# Patient Record
Sex: MALE | Race: OTHER | NOT HISPANIC OR LATINO | Employment: FULL TIME | URBAN - METROPOLITAN AREA
[De-identification: names, ages, dates, MRNs, and addresses within clinical notes are randomized per-mention and may not be internally consistent; named-entity substitution may affect disease eponyms.]

---

## 2022-05-14 ENCOUNTER — HOSPITAL ENCOUNTER (EMERGENCY)
Facility: HOSPITAL | Age: 52
Discharge: HOME | End: 2022-05-14
Attending: EMERGENCY MEDICINE
Payer: OTHER MISCELLANEOUS

## 2022-05-14 VITALS
HEART RATE: 62 BPM | HEIGHT: 60 IN | RESPIRATION RATE: 16 BRPM | SYSTOLIC BLOOD PRESSURE: 130 MMHG | OXYGEN SATURATION: 100 % | BODY MASS INDEX: 24.57 KG/M2 | TEMPERATURE: 98 F | WEIGHT: 125.13 LBS | DIASTOLIC BLOOD PRESSURE: 83 MMHG

## 2022-05-14 DIAGNOSIS — S68.011A AMPUTATION OF RIGHT THUMB, INITIAL ENCOUNTER: Primary | ICD-10-CM

## 2022-05-14 DIAGNOSIS — S69.91XA INJURY OF RIGHT HAND, INITIAL ENCOUNTER: ICD-10-CM

## 2022-05-14 PROCEDURE — 12002 RPR S/N/AX/GEN/TRNK2.6-7.5CM: CPT

## 2022-05-14 PROCEDURE — 96365 THER/PROPH/DIAG IV INF INIT: CPT

## 2022-05-14 PROCEDURE — 90471 IMMUNIZATION ADMIN: CPT | Performed by: REGISTERED NURSE

## 2022-05-14 PROCEDURE — 96375 TX/PRO/DX INJ NEW DRUG ADDON: CPT

## 2022-05-14 PROCEDURE — 99284 EMERGENCY DEPT VISIT MOD MDM: CPT | Mod: 25

## 2022-05-14 PROCEDURE — 90715 TDAP VACCINE 7 YRS/> IM: CPT | Performed by: REGISTERED NURSE

## 2022-05-14 PROCEDURE — 63600175 PHARM REV CODE 636 W HCPCS: Performed by: REGISTERED NURSE

## 2022-05-14 PROCEDURE — 25000003 PHARM REV CODE 250: Performed by: REGISTERED NURSE

## 2022-05-14 RX ORDER — LIDOCAINE HYDROCHLORIDE 10 MG/ML
10 INJECTION, SOLUTION EPIDURAL; INFILTRATION; INTRACAUDAL; PERINEURAL
Status: COMPLETED | OUTPATIENT
Start: 2022-05-14 | End: 2022-05-14

## 2022-05-14 RX ORDER — CEPHALEXIN 500 MG/1
500 CAPSULE ORAL 4 TIMES DAILY
Qty: 20 CAPSULE | Refills: 0 | Status: SHIPPED | OUTPATIENT
Start: 2022-05-14 | End: 2022-05-19

## 2022-05-14 RX ORDER — ONDANSETRON 2 MG/ML
4 INJECTION INTRAMUSCULAR; INTRAVENOUS
Status: COMPLETED | OUTPATIENT
Start: 2022-05-14 | End: 2022-05-14

## 2022-05-14 RX ORDER — MORPHINE SULFATE 4 MG/ML
4 INJECTION, SOLUTION INTRAMUSCULAR; INTRAVENOUS
Status: COMPLETED | OUTPATIENT
Start: 2022-05-14 | End: 2022-05-14

## 2022-05-14 RX ORDER — HYDROCODONE BITARTRATE AND ACETAMINOPHEN 10; 325 MG/1; MG/1
1 TABLET ORAL EVERY 6 HOURS PRN
Qty: 12 TABLET | Refills: 0 | Status: SHIPPED | OUTPATIENT
Start: 2022-05-14 | End: 2022-07-01

## 2022-05-14 RX ORDER — BUPIVACAINE HYDROCHLORIDE 5 MG/ML
10 INJECTION, SOLUTION EPIDURAL; INTRACAUDAL
Status: COMPLETED | OUTPATIENT
Start: 2022-05-14 | End: 2022-05-14

## 2022-05-14 RX ORDER — CEFAZOLIN SODIUM 1 G/50ML
1 SOLUTION INTRAVENOUS
Status: COMPLETED | OUTPATIENT
Start: 2022-05-14 | End: 2022-05-14

## 2022-05-14 RX ADMIN — LIDOCAINE HYDROCHLORIDE 50 MG: 10 INJECTION, SOLUTION EPIDURAL; INFILTRATION; INTRACAUDAL at 01:05

## 2022-05-14 RX ADMIN — BUPIVACAINE HYDROCHLORIDE 50 MG: 5 INJECTION, SOLUTION EPIDURAL; INTRACAUDAL; PERINEURAL at 01:05

## 2022-05-14 RX ADMIN — CEFAZOLIN SODIUM 1 G: 1 SOLUTION INTRAVENOUS at 01:05

## 2022-05-14 RX ADMIN — TETANUS TOXOID, REDUCED DIPHTHERIA TOXOID AND ACELLULAR PERTUSSIS VACCINE, ADSORBED 0.5 ML: 5; 2.5; 8; 8; 2.5 SUSPENSION INTRAMUSCULAR at 01:05

## 2022-05-14 RX ADMIN — BACITRACIN ZINC, NEOMYCIN, POLYMYXIN B 1 EACH: 400; 3.5; 5 OINTMENT TOPICAL at 03:05

## 2022-05-14 RX ADMIN — ONDANSETRON 4 MG: 2 INJECTION INTRAMUSCULAR; INTRAVENOUS at 01:05

## 2022-05-14 RX ADMIN — MORPHINE SULFATE 4 MG: 4 INJECTION INTRAVENOUS at 01:05

## 2022-05-14 NOTE — ED PROVIDER NOTES
Encounter Date: 5/14/2022       History     Chief Complaint   Patient presents with    Laceration     Smashed R hand on some automotive machinery     The history is provided by the patient.   Laceration   The incident occurred just prior to arrival. The laceration is located on the right hand. Injury mechanism: machine press. The pain has been constant since onset. He reports no foreign bodies present. His tetanus status is out of date.   R thumb partial amputation after getting thumb stuck in machine press at work. He denies any other injuries, bleeding well controlled at this time.     Review of patient's allergies indicates:  Not on File  No past medical history on file.  No past surgical history on file.  No family history on file.     Review of Systems   Constitutional: Negative for fever.   HENT: Negative for sore throat.    Respiratory: Negative for shortness of breath.    Cardiovascular: Negative for chest pain.   Gastrointestinal: Negative for nausea.   Genitourinary: Negative for dysuria.   Musculoskeletal: Negative for back pain.        + R thumb partial amputation   Skin: Negative for rash.   Neurological: Negative for weakness.   Hematological: Does not bruise/bleed easily.   All other systems reviewed and are negative.      Physical Exam     Initial Vitals [05/14/22 1251]   BP Pulse Resp Temp SpO2   136/88 60 17 98.4 °F (36.9 °C) 100 %      MAP       --         Physical Exam    Constitutional: He appears well-developed and well-nourished. No distress.   HENT:   Head: Normocephalic and atraumatic.   Nose: Nose normal.   Mouth/Throat: Uvula is midline and oropharynx is clear and moist.   Eyes: Conjunctivae and EOM are normal. Pupils are equal, round, and reactive to light.   Neck: Neck supple.   Normal range of motion.  Cardiovascular: Normal rate and regular rhythm.   Pulmonary/Chest: Effort normal and breath sounds normal. No respiratory distress. He has no decreased breath sounds. He has no wheezes.  He has no rales.   Abdominal: Abdomen is soft. Bowel sounds are normal. There is no abdominal tenderness.   Musculoskeletal:         General: Normal range of motion.      Right hand: Tenderness present.      Cervical back: Normal range of motion and neck supple.      Comments: Partial thumb amputation, FROM of thumb, no pulsatile bleeding     Neurological: He is alert and oriented to person, place, and time. He has normal strength. GCS eye subscore is 4. GCS verbal subscore is 5. GCS motor subscore is 6.   Skin: Skin is warm and dry. Capillary refill takes less than 2 seconds. No rash noted.   Psychiatric: He has a normal mood and affect. His speech is normal and behavior is normal.                     ED Course   Lac Repair    Date/Time: 5/14/2022 3:00 PM  Performed by: PAZ Cowart Jr.  Authorized by: PAZ Cowart Jr.     Consent:     Consent obtained:  Verbal    Consent given by:  Patient    Risks, benefits, and alternatives were discussed: yes      Risks discussed:  Pain and infection  Anesthesia:     Anesthesia method:  Nerve block    Block location:  Right thumb    Block needle gauge:  27 G    Block anesthetic:  Lidocaine 1% w/o epi and bupivacaine 0.5% w/o epi    Block technique:  Digital block    Block injection procedure:  Anatomic landmarks identified, introduced needle, incremental injection, negative aspiration for blood and anatomic landmarks palpated    Block outcome:  Anesthesia achieved  Laceration details:     Location:  Finger    Finger location:  R thumb    Length (cm):  3  Exploration:     Imaging obtained: x-ray      Imaging outcome: foreign body not noted      Wound exploration: wound explored through full range of motion      Wound extent: no foreign bodies/material noted, no underlying fracture noted and no vascular damage noted      Contaminated: no    Treatment:     Area cleansed with:  Povidone-iodine and saline    Amount of cleaning:  Extensive    Irrigation  volume:  1 L    Visualized foreign bodies/material removed: no    Skin repair:     Repair method:  Sutures    Suture size:  3-0    Suture material:  Nylon    Suture technique:  Simple interrupted    Number of sutures:  9  Approximation:     Approximation:  Close  Repair type:     Repair type:  Simple  Post-procedure details:     Dressing:  Splint for protection, sterile dressing, antibiotic ointment and bulky dressing    Procedure completion:  Tolerated well, no immediate complications      Labs Reviewed - No data to display       Imaging Results          X-Ray Finger 2 or More Views Right (Final result)  Result time 05/14/22 14:27:47    Final result by NGOZI Adrian Sr., MD (05/14/22 14:27:47)                 Impression:      There has been amputation of the soft tissue distal to the distal phalanx of the thumb.  There is no radiopaque foreign body visualized in this area.      Electronically signed by: Sukhdeep Adrian MD  Date:    05/14/2022  Time:    14:27             Narrative:    EXAMINATION:  XR FINGER 2 OR MORE VIEWS RIGHT    CLINICAL HISTORY:  right thumb injury;    COMPARISON:  None    FINDINGS:  There has been amputation of the soft tissue distal to the distal phalanx of the thumb.  There is no radiopaque foreign body visualized in this area.  There is no fracture. There is no dislocation.                                 Medications   LIDOcaine (PF) 10 mg/ml (1%) injection 100 mg (50 mg Infiltration Given by Other 5/14/22 1346)   BUPivacaine (PF) 0.5% (5 mg/mL) injection 50 mg (50 mg Subcutaneous Given by Other 5/14/22 1338)   Tdap (BOOSTRIX) vaccine injection 0.5 mL (0.5 mLs Intramuscular Given 5/14/22 1330)   morphine injection 4 mg (4 mg Intravenous Given 5/14/22 1338)   ondansetron injection 4 mg (4 mg Intravenous Given 5/14/22 1338)   ceFAZolin (ANCEF) 1 gram in dextrose 5 % 50 mL IVPB (premix) (0 g Intravenous Stopped 5/14/22 1440)   neomycin-bacitracnZn-polymyxnB packet 1 each (1 each Topical  (Top) Given 5/14/22 2857)         Advised patient to follow-up with orthopedics next week.  Place patient on antibiotics and pain control medicine.  Patient understands that he is to keep wound clean and keep thumb in a splint for protection.  Advised patient that he should follow back up in the emergency room for increased pain or any signs of infection.    I discussed wound care precautions with patient and/or family/caretaker; specifically that all wounds have risk of infection despite efforts to cleanse and debride the wound; and there is a risk of an occult foreign body (and thus increased risk of infection) despite a negative examination.  I discussed with patient need to return for any signs of infection, specifically redness, increased pain, fever, drainage of pus, or any concern, immediately.                 Clinical Impression:   Final diagnoses:  [S68.011A] Amputation of right thumb, initial encounter (Primary)  [S69.91XA] Injury of right hand, initial encounter          ED Disposition Condition    Discharge Stable        ED Prescriptions     Medication Sig Dispense Start Date End Date Auth. Provider    cephALEXin (KEFLEX) 500 MG capsule Take 1 capsule (500 mg total) by mouth 4 (four) times daily. for 5 days 20 capsule 5/14/2022 5/19/2022 PAZ Cowart Jr.    HYDROcodone-acetaminophen (NORCO)  mg per tablet Take 1 tablet by mouth every 6 (six) hours as needed for Pain. 12 tablet 5/14/2022  PAZ Cowart Jr.        Follow-up Information     Follow up With Specialties Details Why Contact Info Additional Information    [facility] - Emergency Dept. Emergency Medicine  If symptoms worsen [address]  [facility] [address] [phone]     [facility] Schedule an appointment as soon as possible for a visit   [address] [facility] [address] [phone] Please park on the [address] side and use the Clinic  entrance. Check in on the 1st floor, to the right across from the café.           To Zuniga Jr., [facility]  05/14/22 1747       To Zuniga Jr., [facility]  06/01/22 0934

## 2022-05-18 ENCOUNTER — OFFICE VISIT (OUTPATIENT)
Dept: ORTHOPEDICS | Facility: CLINIC | Age: 52
End: 2022-05-18
Payer: OTHER MISCELLANEOUS

## 2022-05-18 VITALS — HEIGHT: 60 IN | BODY MASS INDEX: 24.54 KG/M2 | WEIGHT: 125 LBS

## 2022-05-18 DIAGNOSIS — S68.011A: Primary | ICD-10-CM

## 2022-05-18 PROCEDURE — 99213 OFFICE O/P EST LOW 20 MIN: CPT | Mod: PBBFAC | Performed by: PHYSICIAN ASSISTANT

## 2022-05-18 PROCEDURE — 99999 PR PBB SHADOW E&M-EST. PATIENT-LVL III: ICD-10-PCS | Mod: PBBFAC,,, | Performed by: PHYSICIAN ASSISTANT

## 2022-05-18 PROCEDURE — 99204 PR OFFICE/OUTPT VISIT, NEW, LEVL IV, 45-59 MIN: ICD-10-PCS | Mod: S$PBB,,, | Performed by: PHYSICIAN ASSISTANT

## 2022-05-18 PROCEDURE — 99204 OFFICE O/P NEW MOD 45 MIN: CPT | Mod: S$PBB,,, | Performed by: PHYSICIAN ASSISTANT

## 2022-05-18 PROCEDURE — 99999 PR PBB SHADOW E&M-EST. PATIENT-LVL III: CPT | Mod: PBBFAC,,, | Performed by: PHYSICIAN ASSISTANT

## 2022-05-18 NOTE — PROGRESS NOTES
Subjective:      Patient ID: Cheikh Acevedo is a 52 y.o. male.    Chief Complaint: No chief complaint on file.      HPI: Cheikh Acevedo is a 52-year-old male in clinic today for evaluation of right thumb injury.  Patient suffered this injury or.  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  Patient was seen emergency department wound was sutured.  Radiographs ruled out fracture.  Patient was started on Keflex and given pain medication    History reviewed. No pertinent past medical history.    Current Outpatient Medications:     cephALEXin (KEFLEX) 500 MG capsule, Take 1 capsule (500 mg total) by mouth 4 (four) times daily. for 5 days, Disp: 20 capsule, Rfl: 0    HYDROcodone-acetaminophen (NORCO)  mg per tablet, Take 1 tablet by mouth every 6 (six) hours as needed for Pain., Disp: 12 tablet, Rfl: 0  Review of patient's allergies indicates:  No Known Allergies    Ht 5' (1.524 m)   Wt 56.7 kg (125 lb)   BMI 24.41 kg/m²     ROS      Objective:    Ortho Exam   Right thumb:  Patient arrived to clinic and dressing that was placed in the emergency department, this was removed for physical exam  Sutures intact, wound margins loosely approximated, no signs of infection  Skin surrounding the laceration is macerated appearance  Moderate edema  Diffuse TTP  Sensation and pulses intact    GEN: Well developed, well nourished male. AAOX3. No acute distress.   Normocephalic, atraumatic.   GEN  Breathing unlabored.  Mood and affect appropriate.        Assessment:     Imaging:  No new imaging ordered today        1. Traumatic amputation of tip of right thumb, initial encounter          Plan:       Incision clean with normal saline and dressed with Xeroform, 4 x 4, and Coban.  Patient instructed to perform similar dressing changes twice daily.  Continue with antibiotic until he has taken all them.  return to clinic in 1 week for wound check     Follow up in about 1 week (around 5/25/2022).          Patient note was created using  MModal Dictation.  Any errors in syntax or even information may not have been identified and edited on initial review prior to signing this note.

## 2022-05-25 ENCOUNTER — OFFICE VISIT (OUTPATIENT)
Dept: ORTHOPEDICS | Facility: CLINIC | Age: 52
End: 2022-05-25
Payer: OTHER MISCELLANEOUS

## 2022-05-25 VITALS
HEIGHT: 60 IN | BODY MASS INDEX: 24.54 KG/M2 | WEIGHT: 125 LBS | SYSTOLIC BLOOD PRESSURE: 128 MMHG | DIASTOLIC BLOOD PRESSURE: 78 MMHG | HEART RATE: 72 BPM

## 2022-05-25 DIAGNOSIS — S68.011D: Primary | ICD-10-CM

## 2022-05-25 PROCEDURE — 99999 PR PBB SHADOW E&M-EST. PATIENT-LVL III: CPT | Mod: PBBFAC,,, | Performed by: PHYSICIAN ASSISTANT

## 2022-05-25 PROCEDURE — 99214 OFFICE O/P EST MOD 30 MIN: CPT | Mod: S$GLB,,, | Performed by: PHYSICIAN ASSISTANT

## 2022-05-25 PROCEDURE — 99999 PR PBB SHADOW E&M-EST. PATIENT-LVL III: ICD-10-PCS | Mod: PBBFAC,,, | Performed by: PHYSICIAN ASSISTANT

## 2022-05-25 PROCEDURE — 99214 PR OFFICE/OUTPT VISIT, EST, LEVL IV, 30-39 MIN: ICD-10-PCS | Mod: S$GLB,,, | Performed by: PHYSICIAN ASSISTANT

## 2022-05-25 RX ORDER — CEPHALEXIN 500 MG/1
500 CAPSULE ORAL EVERY 6 HOURS
COMMUNITY
End: 2022-05-25

## 2022-05-25 RX ORDER — SULFAMETHOXAZOLE AND TRIMETHOPRIM 800; 160 MG/1; MG/1
1 TABLET ORAL 2 TIMES DAILY
Qty: 14 TABLET | Refills: 0 | Status: SHIPPED | OUTPATIENT
Start: 2022-05-25 | End: 2022-07-01

## 2022-05-25 NOTE — PROGRESS NOTES
Subjective:      Patient ID: Cheikh Acevedo is a 52 y.o. male.    Chief Complaint: Pain, Swelling, and Numbness of the Right Hand      HPI: Cheikh Acevedo is a 52-year-old male in clinic today for follow-up of right thumb tip traumatic amputation.  Injury occurred 11 days ago.  Patient has completed course of antibiotics as prescribed in the emergency department.  He reports some drainage and he is concerned for infection    History reviewed. No pertinent past medical history.    Current Outpatient Medications:     HYDROcodone-acetaminophen (NORCO)  mg per tablet, Take 1 tablet by mouth every 6 (six) hours as needed for Pain., Disp: 12 tablet, Rfl: 0    sulfamethoxazole-trimethoprim 800-160mg (BACTRIM DS) 800-160 mg Tab, Take 1 tablet by mouth 2 (two) times daily., Disp: 14 tablet, Rfl: 0  Review of patient's allergies indicates:  No Known Allergies    /78   Pulse 72   Ht 5' (1.524 m)   Wt 56.7 kg (125 lb)   BMI 24.41 kg/m²     ROS      Objective:    Ortho Exam   Right thumb:  Sutures intact, wound margins well approximated, no signs of infection at this time  Skin surrounding laceration is less macerated than previous  Moderate edema  Diffuse TTP  Sensation and pulses intact    GEN: Well developed, well nourished male. AAOX3. No acute distress.   Normocephalic, atraumatic.   GEN  Breathing unlabored.  Mood and affect appropriate.        Assessment:     Imaging:  No new imaging ordered today        1. Traumatic amputation of tip of right thumb, subsequent encounter          Plan:       Recommended patient start taking Bactrim for concern possible infection since he reports drainage.  Will leave the sutures in for another week and see the patient back at that time for wound check and consideration of suture removal.  Patient should return to clinic if symptoms worsen or go to the emergency department if after hours    Orders Placed This Encounter    sulfamethoxazole-trimethoprim 800-160mg (BACTRIM DS) 800-160 mg  Tab     Follow up in about 1 week (around 6/1/2022).          Patient note was created using MModal Dictation.  Any errors in syntax or even information may not have been identified and edited on initial review prior to signing this note.

## 2022-05-30 ENCOUNTER — TELEPHONE (OUTPATIENT)
Dept: ORTHOPEDICS | Facility: CLINIC | Age: 52
End: 2022-05-30
Payer: OTHER MISCELLANEOUS

## 2022-05-30 NOTE — TELEPHONE ENCOUNTER
Spoke with patient's sister and she will bring in the patient's forms on his next visit. Understanding verbalized.

## 2022-05-30 NOTE — TELEPHONE ENCOUNTER
----- Message from Tera Braga sent at 5/30/2022  3:52 PM CDT -----  Contact: obinna Morris is requesting a call from Nakia in regards to a form that needs to be completed. Please call her back at [phone].          Thanks  DD

## 2022-06-01 ENCOUNTER — OFFICE VISIT (OUTPATIENT)
Dept: ORTHOPEDICS | Facility: CLINIC | Age: 52
End: 2022-06-01
Payer: OTHER MISCELLANEOUS

## 2022-06-01 VITALS
WEIGHT: 125 LBS | SYSTOLIC BLOOD PRESSURE: 119 MMHG | BODY MASS INDEX: 24.54 KG/M2 | HEIGHT: 60 IN | HEART RATE: 73 BPM | DIASTOLIC BLOOD PRESSURE: 82 MMHG

## 2022-06-01 DIAGNOSIS — S68.011D: Primary | ICD-10-CM

## 2022-06-01 PROCEDURE — 99214 OFFICE O/P EST MOD 30 MIN: CPT | Mod: S$PBB,,, | Performed by: PHYSICIAN ASSISTANT

## 2022-06-01 PROCEDURE — 99214 PR OFFICE/OUTPT VISIT, EST, LEVL IV, 30-39 MIN: ICD-10-PCS | Mod: S$PBB,,, | Performed by: PHYSICIAN ASSISTANT

## 2022-06-01 PROCEDURE — 99999 PR PBB SHADOW E&M-EST. PATIENT-LVL III: CPT | Mod: PBBFAC,,, | Performed by: PHYSICIAN ASSISTANT

## 2022-06-01 PROCEDURE — 99999 PR PBB SHADOW E&M-EST. PATIENT-LVL III: ICD-10-PCS | Mod: PBBFAC,,, | Performed by: PHYSICIAN ASSISTANT

## 2022-06-01 PROCEDURE — 99213 OFFICE O/P EST LOW 20 MIN: CPT | Mod: PBBFAC | Performed by: PHYSICIAN ASSISTANT

## 2022-06-01 NOTE — PROGRESS NOTES
Subjective:      Patient ID: Cheikh Acevedo is a 52 y.o. male.    Chief Complaint: Pain, Swelling, and Numbness of the Left Hand      HPI: Cheikh Acevedo is a 52-year-old male in clinic today for follow-up of right thumb tip traumatic amputation.  Injury occurred 18 days ago.  Patient was seen in the emergency department where laceration was sutured and he was placed on antibiotics.  At his last visit there was some concern for infection, so I put him on Bactrim.  Patient says that he has not had any signs of infection in the past few days and feels that is improving.    History reviewed. No pertinent past medical history.    Current Outpatient Medications:     sulfamethoxazole-trimethoprim 800-160mg (BACTRIM DS) 800-160 mg Tab, Take 1 tablet by mouth 2 (two) times daily., Disp: 14 tablet, Rfl: 0    HYDROcodone-acetaminophen (NORCO)  mg per tablet, Take 1 tablet by mouth every 6 (six) hours as needed for Pain. (Patient not taking: Reported on 6/1/2022), Disp: 12 tablet, Rfl: 0  Review of patient's allergies indicates:  No Known Allergies    /82   Pulse 73   Ht 5' (1.524 m)   Wt 56.7 kg (125 lb)   BMI 24.41 kg/m²     ROS      Objective:    Ortho Exam   Right thumb:  Sutures intact, wound margins well healed, no signs of infection  Skin surrounding the laceration is dry  No edema  No TTP  Motor exam normal  Sensation and pulses intact    GEN: Well developed, well nourished male. AAOX3. No acute distress.   Normocephalic, atraumatic.   GEN  Breathing unlabored.  Mood and affect appropriate.        Assessment:     Imaging:  No new imaging ordered today        1. Traumatic amputation of tip of right thumb, subsequent encounter          Plan:      Sutures removed, patient instructed on normal wound care with soap water.  Patient may place some Neosporin on the wound once daily since it appears so dry at this point.  Wound should be covered only when working with hand and there was concern for contamination.  Patient  should stay out of work until his next follow-up in 2 weeks     Follow up in about 2 weeks (around 6/15/2022).          Patient note was created using MModal Dictation.  Any errors in syntax or even information may not have been identified and edited on initial review prior to signing this note.

## 2022-06-15 ENCOUNTER — OFFICE VISIT (OUTPATIENT)
Dept: ORTHOPEDICS | Facility: CLINIC | Age: 52
End: 2022-06-15
Payer: OTHER MISCELLANEOUS

## 2022-06-15 VITALS
WEIGHT: 125 LBS | HEART RATE: 86 BPM | BODY MASS INDEX: 24.54 KG/M2 | HEIGHT: 60 IN | TEMPERATURE: 98 F | DIASTOLIC BLOOD PRESSURE: 81 MMHG | SYSTOLIC BLOOD PRESSURE: 127 MMHG

## 2022-06-15 DIAGNOSIS — S68.011D: Primary | ICD-10-CM

## 2022-06-15 PROCEDURE — 99999 PR PBB SHADOW E&M-EST. PATIENT-LVL III: CPT | Mod: PBBFAC,,, | Performed by: PHYSICIAN ASSISTANT

## 2022-06-15 PROCEDURE — 99214 PR OFFICE/OUTPT VISIT, EST, LEVL IV, 30-39 MIN: ICD-10-PCS | Mod: S$GLB,,, | Performed by: PHYSICIAN ASSISTANT

## 2022-06-15 PROCEDURE — 99214 OFFICE O/P EST MOD 30 MIN: CPT | Mod: S$GLB,,, | Performed by: PHYSICIAN ASSISTANT

## 2022-06-15 PROCEDURE — 99999 PR PBB SHADOW E&M-EST. PATIENT-LVL III: ICD-10-PCS | Mod: PBBFAC,,, | Performed by: PHYSICIAN ASSISTANT

## 2022-06-15 NOTE — PROGRESS NOTES
Subjective:      Patient ID: Cheikh Acevedo is a 52 y.o. male.    Chief Complaint: Pain of the Right Hand      HPI: Cheikh Acevedo  Is a 52-year-old male in clinic today for follow-up of right thumb tip traumatic amputation.  Injury occurred about a month ago.  Patient was last seen in this clinic 2 weeks ago.  He is doing much better at this time.  He has been cleaning the wound with regular soap water and applying Neosporin once daily.  Patient does still complain of pain at the tip of the finger, but he reports overall pain is improving.  Patient reports no signs of infection    History reviewed. No pertinent past medical history.    Current Outpatient Medications:     HYDROcodone-acetaminophen (NORCO)  mg per tablet, Take 1 tablet by mouth every 6 (six) hours as needed for Pain. (Patient not taking: No sig reported), Disp: 12 tablet, Rfl: 0    sulfamethoxazole-trimethoprim 800-160mg (BACTRIM DS) 800-160 mg Tab, Take 1 tablet by mouth 2 (two) times daily. (Patient not taking: Reported on 6/15/2022), Disp: 14 tablet, Rfl: 0  Review of patient's allergies indicates:  No Known Allergies    /81 (BP Location: Left arm, Patient Position: Sitting, BP Method: Medium (Automatic))   Pulse 86   Temp 98.4 °F (36.9 °C)   Ht 5' (1.524 m)   Wt 56.7 kg (125 lb)   BMI 24.41 kg/m²     ROS      Objective:    Ortho Exam   Right thumb:  Tip is healing well, surrounding skin is still dry   No edema   No TTP   Nail looks like it will fall off soon, but underlying bed appears healthy   Motor exam normal   Sensation and pulses intact    GEN: Well developed, well nourished  male. AAOX3. No acute distress.   Normocephalic, atraumatic.   GEN  Breathing unlabored.  Mood and affect  appropriate.        Assessment:     Imaging:  No new imaging ordered today        1. Traumatic amputation of tip of right thumb, subsequent encounter          Plan:       Recommended patient continue with normal wound care that is been doing.  If nail falls  off he should protect the nail bed while in new nail grows in.  Patient return to clinic in about 1 month for further evaluation       Follow up in about 1 month (around 7/15/2022).          Patient note was created using MModal Dictation.  Any errors in syntax or even information may not have been identified and edited on initial review prior to signing this note.

## 2022-07-01 ENCOUNTER — TELEPHONE (OUTPATIENT)
Dept: ORTHOPEDICS | Facility: CLINIC | Age: 52
End: 2022-07-01
Payer: OTHER MISCELLANEOUS

## 2022-07-01 ENCOUNTER — OFFICE VISIT (OUTPATIENT)
Dept: ORTHOPEDICS | Facility: CLINIC | Age: 52
End: 2022-07-01
Payer: OTHER MISCELLANEOUS

## 2022-07-01 VITALS
SYSTOLIC BLOOD PRESSURE: 133 MMHG | BODY MASS INDEX: 24.54 KG/M2 | HEART RATE: 66 BPM | DIASTOLIC BLOOD PRESSURE: 84 MMHG | WEIGHT: 125 LBS | TEMPERATURE: 99 F | HEIGHT: 60 IN

## 2022-07-01 DIAGNOSIS — S68.011D: Primary | ICD-10-CM

## 2022-07-01 PROBLEM — S68.011A: Status: ACTIVE | Noted: 2022-07-01

## 2022-07-01 PROCEDURE — 87077 CULTURE AEROBIC IDENTIFY: CPT | Mod: 59 | Performed by: ORTHOPAEDIC SURGERY

## 2022-07-01 PROCEDURE — 99213 OFFICE O/P EST LOW 20 MIN: CPT | Mod: S$PBB,,, | Performed by: ORTHOPAEDIC SURGERY

## 2022-07-01 PROCEDURE — 99213 PR OFFICE/OUTPT VISIT, EST, LEVL III, 20-29 MIN: ICD-10-PCS | Mod: S$PBB,,, | Performed by: ORTHOPAEDIC SURGERY

## 2022-07-01 PROCEDURE — 99999 PR PBB SHADOW E&M-EST. PATIENT-LVL IV: ICD-10-PCS | Mod: PBBFAC,,, | Performed by: ORTHOPAEDIC SURGERY

## 2022-07-01 PROCEDURE — 87186 SC STD MICRODIL/AGAR DIL: CPT | Performed by: ORTHOPAEDIC SURGERY

## 2022-07-01 PROCEDURE — 99999 PR PBB SHADOW E&M-EST. PATIENT-LVL IV: CPT | Mod: PBBFAC,,, | Performed by: ORTHOPAEDIC SURGERY

## 2022-07-01 PROCEDURE — 87076 CULTURE ANAEROBE IDENT EACH: CPT | Performed by: ORTHOPAEDIC SURGERY

## 2022-07-01 PROCEDURE — 87075 CULTR BACTERIA EXCEPT BLOOD: CPT | Performed by: ORTHOPAEDIC SURGERY

## 2022-07-01 PROCEDURE — 99214 OFFICE O/P EST MOD 30 MIN: CPT | Mod: PBBFAC | Performed by: ORTHOPAEDIC SURGERY

## 2022-07-01 PROCEDURE — 87070 CULTURE OTHR SPECIMN AEROBIC: CPT | Performed by: ORTHOPAEDIC SURGERY

## 2022-07-01 RX ORDER — CEPHALEXIN 500 MG/1
500 CAPSULE ORAL EVERY 6 HOURS
Qty: 28 CAPSULE | Refills: 0 | Status: SHIPPED | OUTPATIENT
Start: 2022-07-01 | End: 2022-07-05 | Stop reason: ALTCHOICE

## 2022-07-01 NOTE — PATIENT INSTRUCTIONS
Take cephalexin 500 mg 4 times a day.  Continue with daily dressing changes.  Return as scheduled on July 13th

## 2022-07-01 NOTE — TELEPHONE ENCOUNTER
Spoke with patient's sister and scheduled him an appointment to come in today. Sister verbalized understanding.

## 2022-07-01 NOTE — PROGRESS NOTES
Subjective:     Patient ID: Cheikh Acevedo is a 52 y.o. male.    Chief Complaint: Pain of the Right Hand      HPI:  The patient returns for follow-up accompanied by his sister who interprets.  He had the right thumb tip laceration with soft tissue loss on May 14. He did take Bactrim for a while but has been off of that for at least 2 weeks.  Has noticed some yellowish drainage and believes it is infected.  There is some numbness and tingling around the end of the thumb.      History reviewed. No pertinent past medical history.  History reviewed. No pertinent surgical history.  History reviewed. No pertinent family history.  Social History     Socioeconomic History    Marital status:    Tobacco Use    Smoking status: Former Smoker    Smokeless tobacco: Never Used   Substance and Sexual Activity    Alcohol use: Yes     Comment: occassional    Drug use: Never     Medication List with Changes/Refills   New Medications    CEPHALEXIN (KEFLEX) 500 MG CAPSULE    Take 1 capsule (500 mg total) by mouth every 6 (six) hours. for 7 days   Discontinued Medications    HYDROCODONE-ACETAMINOPHEN (NORCO)  MG PER TABLET    Take 1 tablet by mouth every 6 (six) hours as needed for Pain.    SULFAMETHOXAZOLE-TRIMETHOPRIM 800-160MG (BACTRIM DS) 800-160 MG TAB    Take 1 tablet by mouth 2 (two) times daily.     Review of patient's allergies indicates:  No Known Allergies  ROS     Objective:   Body mass index is 24.41 kg/m².  Vitals:    07/01/22 1138   BP: 133/84   Pulse: 66   Temp: 98.5 °F (36.9 °C)   Weight: 56.7 kg (125 lb)   Height: 5' (1.524 m)   PainSc: 0-No pain   PainLoc: Hand       PHYSICAL EXAM:  Well-developed well-nourished male no acute distress.    The right thumb tip has the thick eschar over the very tip with some lifting up of the nail.  There is no erythema.  I was able to express a drop of serosanguineous fluid which is sent for aerobic and anaerobic cultures.    Traumatic amputation of tip of right thumb,  subsequent encounter  -     Aerobic culture  -     CULTURE, ANAEROBE  -     cephALEXin (KEFLEX) 500 MG capsule; Take 1 capsule (500 mg total) by mouth every 6 (six) hours. for 7 days  Dispense: 28 capsule; Refill: 0        Plan:  Continue with daily dressing changes, take cephalexin fiber mg 4 times a day.  Return on July 13th as planned or sooner if needed    Patient Instructions   Take cephalexin 500 mg 4 times a day.  Continue with daily dressing changes.  Return as scheduled on July 13th             Bradford Kinney MD, FAAOS  [facility], Orthopedic Trauma Service  [place]

## 2022-07-01 NOTE — TELEPHONE ENCOUNTER
----- Message from Sabina Emmanuel sent at 7/1/2022  9:24 AM CDT -----  Contact: Patient sister Arturo Benz  Patient sister Arturo Benz would like to consult with a nurse in regards to her brother current symptoms. Please call back at [phone]

## 2022-07-04 LAB
BACTERIA SPEC AEROBE CULT: ABNORMAL
BACTERIA SPEC AEROBE CULT: ABNORMAL

## 2022-07-05 RX ORDER — CIPROFLOXACIN 500 MG/1
500 TABLET ORAL EVERY 12 HOURS
Qty: 20 TABLET | Refills: 0 | Status: SHIPPED | OUTPATIENT
Start: 2022-07-05 | End: 2022-07-15

## 2022-07-06 LAB — BACTERIA SPEC ANAEROBE CULT: NORMAL

## 2022-07-13 ENCOUNTER — OFFICE VISIT (OUTPATIENT)
Dept: ORTHOPEDICS | Facility: CLINIC | Age: 52
End: 2022-07-13
Payer: OTHER MISCELLANEOUS

## 2022-07-13 VITALS
WEIGHT: 125 LBS | DIASTOLIC BLOOD PRESSURE: 83 MMHG | HEIGHT: 60 IN | BODY MASS INDEX: 24.54 KG/M2 | HEART RATE: 58 BPM | SYSTOLIC BLOOD PRESSURE: 130 MMHG

## 2022-07-13 DIAGNOSIS — S68.011D: Primary | ICD-10-CM

## 2022-07-13 PROCEDURE — 99213 PR OFFICE/OUTPT VISIT, EST, LEVL III, 20-29 MIN: ICD-10-PCS | Mod: S$GLB,,, | Performed by: ORTHOPAEDIC SURGERY

## 2022-07-13 PROCEDURE — 99213 OFFICE O/P EST LOW 20 MIN: CPT | Mod: S$GLB,,, | Performed by: ORTHOPAEDIC SURGERY

## 2022-07-13 PROCEDURE — 99999 PR PBB SHADOW E&M-EST. PATIENT-LVL III: ICD-10-PCS | Mod: PBBFAC,,, | Performed by: ORTHOPAEDIC SURGERY

## 2022-07-13 PROCEDURE — 99999 PR PBB SHADOW E&M-EST. PATIENT-LVL III: CPT | Mod: PBBFAC,,, | Performed by: ORTHOPAEDIC SURGERY

## 2022-07-13 NOTE — PROGRESS NOTES
Subjective:     Patient ID: Cheikh Acevedo is a 52 y.o. male.    Chief Complaint: Pain and Swelling of the Right Hand      HPI:  The patient returns for follow-up of his left thumb tip laceration of May 14th.  Accompanied by his sister who interprets.  He has been on Cipro for cultures that grew Pseudomonas.  Still has occasional drainage at the edge of the eschar but not as much.  There is pain if he hits the end of the thumb but otherwise not.  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 No pertinent past medical history.  History reviewed. No pertinent surgical history.  History reviewed. No pertinent family history.  Social History     Socioeconomic History    Marital status:    Tobacco Use    Smoking status: Former Smoker    Smokeless tobacco: Never Used   Substance and Sexual Activity    Alcohol use: Yes     Comment: occassional    Drug use: Never     Medication List with Changes/Refills   Current Medications    CIPROFLOXACIN HCL (CIPRO) 500 MG TABLET    Take 1 tablet (500 mg total) by mouth every 12 (twelve) hours. for 10 days     Review of patient's allergies indicates:  No Known Allergies  ROS     Objective:   Body mass index is 24.41 kg/m².  Vitals:    07/13/22 0820   BP: 130/83   Pulse: (!) 58   Weight: 56.7 kg (125 lb)   Height: 5' (1.524 m)   PainSc:   3   PainLoc: Hand       PHYSICAL EXAM:  Well-developed well-nourished male no acute distress.  Awake, alert, oriented, cooperative with evaluation.    Right thumb with the slowly shrinking eschar.  The nail is loosening.  No erythema or drainage.    Traumatic amputation of tip of right thumb, subsequent encounter        Plan:  Continue local care measures.  Finish the Cipro.  Unable to return to work.  Re-evaluate in 3 weeks with plans that he can hopefully return to work at that point    Patient Instructions   Continue with present wound care measures.  Finish all of  the Cipro antibiotic.    Return in 3 weeks.  Unable to return to usual job at present             Bradford Kinney MD, FAAOS [facility], Orthopedic Trauma Service  [place]

## 2022-07-13 NOTE — PATIENT INSTRUCTIONS
Continue with present wound care measures.  Finish all of the Cipro antibiotic.    Return in 3 weeks.  Unable to return to usual job at present

## 2022-08-01 ENCOUNTER — OFFICE VISIT (OUTPATIENT)
Dept: ORTHOPEDICS | Facility: CLINIC | Age: 52
End: 2022-08-01
Payer: OTHER MISCELLANEOUS

## 2022-08-01 VITALS
DIASTOLIC BLOOD PRESSURE: 80 MMHG | HEIGHT: 60 IN | HEART RATE: 57 BPM | BODY MASS INDEX: 24.54 KG/M2 | SYSTOLIC BLOOD PRESSURE: 136 MMHG | WEIGHT: 125 LBS

## 2022-08-01 DIAGNOSIS — S68.011D: Primary | ICD-10-CM

## 2022-08-01 PROCEDURE — 99999 PR PBB SHADOW E&M-EST. PATIENT-LVL III: ICD-10-PCS | Mod: PBBFAC,,, | Performed by: PHYSICIAN ASSISTANT

## 2022-08-01 PROCEDURE — 99214 OFFICE O/P EST MOD 30 MIN: CPT | Mod: S$GLB,,, | Performed by: PHYSICIAN ASSISTANT

## 2022-08-01 PROCEDURE — 99214 PR OFFICE/OUTPT VISIT, EST, LEVL IV, 30-39 MIN: ICD-10-PCS | Mod: S$GLB,,, | Performed by: PHYSICIAN ASSISTANT

## 2022-08-01 PROCEDURE — 99999 PR PBB SHADOW E&M-EST. PATIENT-LVL III: CPT | Mod: PBBFAC,,, | Performed by: PHYSICIAN ASSISTANT

## 2022-08-01 NOTE — PROGRESS NOTES
Subjective:      Patient ID: Cheikh Acevedo is a 52 y.o. male.    Chief Complaint: Pain of the Right Hand      HPI: Cheikh Acevedo is a 52-year-old male in clinic today for follow-up of left thumb tip laceration on 05/14/2022.  Patient has completed the course of Cipro and is doing well at this time.  He reports that he is no longer experiencing drainage from the wound.  patient reports pain is greatly improved.  He has not been at work because his job requires the use of machinery that he would have difficulty working at this time, but he reports that he feels he is getting close to being able to return    History reviewed. No pertinent past medical history.  No current outpatient medications on file.  Review of patient's allergies indicates:  No Known Allergies    /80   Pulse (!) 57   Ht 5' (1.524 m)   Wt 56.7 kg (125 lb)   BMI 24.41 kg/m²     ROS      Objective:    Ortho Exam   Right thumb:  Wound is well healed, no signs of infection  ROM decreased secondary to stiffness  Motor exam normal  Sensation and pulses intact  Cap refill brisk    GEN: Well developed, well nourished male. AAOX3. No acute distress.   Normocephalic, atraumatic.   GEN  Breathing unlabored.  Mood and affect appropriate.        Assessment:     Imaging:  No new imaging ordered today        1. 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  He would like to work on ROM exercises for a couple of weeks and then return to work.  I think this is a good plan because I think the patient still has a ROM deficit secondary to lack of use.  Patient will begin to increase activities as tolerated and use the thumb as much as he can.  Patient may return to work with no restrictions on 08/15/2022.  Patient will return to clinic as needed     Follow up if symptoms worsen or fail to improve.          Patient note was created using MModal Dictation.  Any errors in syntax or even  information may not have been identified and edited on initial review prior to signing this note.